# Patient Record
Sex: MALE | Race: WHITE | NOT HISPANIC OR LATINO | Employment: FULL TIME | URBAN - METROPOLITAN AREA
[De-identification: names, ages, dates, MRNs, and addresses within clinical notes are randomized per-mention and may not be internally consistent; named-entity substitution may affect disease eponyms.]

---

## 2021-04-22 ENCOUNTER — OCCUPATIONAL HEALTH (OUTPATIENT)
Dept: URGENT CARE | Facility: CLINIC | Age: 31
End: 2021-04-22

## 2021-04-22 PROCEDURE — 80305 DRUG TEST PRSMV DIR OPT OBS: CPT | Mod: S$GLB,,, | Performed by: EMERGENCY MEDICINE

## 2021-04-22 PROCEDURE — 99499 UNLISTED E&M SERVICE: CPT | Mod: S$GLB,,, | Performed by: EMERGENCY MEDICINE

## 2021-04-22 PROCEDURE — 80305 PR NON-DOT DRUG SCREENS: ICD-10-PCS | Mod: S$GLB,,, | Performed by: EMERGENCY MEDICINE

## 2021-04-22 PROCEDURE — 99499 PHYSICAL - BASIC COMPLEXITY: ICD-10-PCS | Mod: S$GLB,,, | Performed by: EMERGENCY MEDICINE

## 2023-05-31 ENCOUNTER — OFFICE VISIT (OUTPATIENT)
Dept: UROLOGY | Facility: CLINIC | Age: 33
End: 2023-05-31
Payer: COMMERCIAL

## 2023-05-31 VITALS
HEIGHT: 73 IN | BODY MASS INDEX: 30.97 KG/M2 | WEIGHT: 233.69 LBS | HEART RATE: 77 BPM | DIASTOLIC BLOOD PRESSURE: 79 MMHG | SYSTOLIC BLOOD PRESSURE: 134 MMHG

## 2023-05-31 DIAGNOSIS — E29.1 TESTICULAR FAILURE: Primary | ICD-10-CM

## 2023-05-31 DIAGNOSIS — I86.1 VARICOCELE: ICD-10-CM

## 2023-05-31 PROCEDURE — 1159F MED LIST DOCD IN RCRD: CPT | Mod: CPTII,S$GLB,, | Performed by: UROLOGY

## 2023-05-31 PROCEDURE — 3075F SYST BP GE 130 - 139MM HG: CPT | Mod: CPTII,S$GLB,, | Performed by: UROLOGY

## 2023-05-31 PROCEDURE — 3008F BODY MASS INDEX DOCD: CPT | Mod: CPTII,S$GLB,, | Performed by: UROLOGY

## 2023-05-31 PROCEDURE — 99999 PR PBB SHADOW E&M-EST. PATIENT-LVL III: CPT | Mod: PBBFAC,,, | Performed by: UROLOGY

## 2023-05-31 PROCEDURE — 1160F RVW MEDS BY RX/DR IN RCRD: CPT | Mod: CPTII,S$GLB,, | Performed by: UROLOGY

## 2023-05-31 PROCEDURE — 3078F PR MOST RECENT DIASTOLIC BLOOD PRESSURE < 80 MM HG: ICD-10-PCS | Mod: CPTII,S$GLB,, | Performed by: UROLOGY

## 2023-05-31 PROCEDURE — 99204 PR OFFICE/OUTPT VISIT, NEW, LEVL IV, 45-59 MIN: ICD-10-PCS | Mod: S$GLB,,, | Performed by: UROLOGY

## 2023-05-31 PROCEDURE — 1160F PR REVIEW ALL MEDS BY PRESCRIBER/CLIN PHARMACIST DOCUMENTED: ICD-10-PCS | Mod: CPTII,S$GLB,, | Performed by: UROLOGY

## 2023-05-31 PROCEDURE — 3078F DIAST BP <80 MM HG: CPT | Mod: CPTII,S$GLB,, | Performed by: UROLOGY

## 2023-05-31 PROCEDURE — 3075F PR MOST RECENT SYSTOLIC BLOOD PRESS GE 130-139MM HG: ICD-10-PCS | Mod: CPTII,S$GLB,, | Performed by: UROLOGY

## 2023-05-31 PROCEDURE — 1159F PR MEDICATION LIST DOCUMENTED IN MEDICAL RECORD: ICD-10-PCS | Mod: CPTII,S$GLB,, | Performed by: UROLOGY

## 2023-05-31 PROCEDURE — 3008F PR BODY MASS INDEX (BMI) DOCUMENTED: ICD-10-PCS | Mod: CPTII,S$GLB,, | Performed by: UROLOGY

## 2023-05-31 PROCEDURE — 99999 PR PBB SHADOW E&M-EST. PATIENT-LVL III: ICD-10-PCS | Mod: PBBFAC,,, | Performed by: UROLOGY

## 2023-05-31 PROCEDURE — 99204 OFFICE O/P NEW MOD 45 MIN: CPT | Mod: S$GLB,,, | Performed by: UROLOGY

## 2023-05-31 RX ORDER — TRAZODONE HYDROCHLORIDE 50 MG/1
0.5 TABLET ORAL NIGHTLY PRN
COMMUNITY
Start: 2023-03-22

## 2023-05-31 RX ORDER — LORAZEPAM 0.5 MG/1
0.5 TABLET ORAL
COMMUNITY
Start: 2023-03-22

## 2023-05-31 RX ORDER — PAROXETINE HYDROCHLORIDE HEMIHYDRATE 25 MG/1
1 TABLET, FILM COATED, EXTENDED RELEASE ORAL DAILY
COMMUNITY
Start: 2023-03-22

## 2023-05-31 NOTE — LETTER
May 31, 2023        Diego Love MD  [address]  [place] [address]             [facility]  [address]  [facility] [address]  Phone: [phone]   Patient: Binh Owusu   MR Number: [id number]   YOB: 1990   Date of Visit: 5/31/2023       Dear Dr. Love:    Thank you for referring Binh Owusu to me for evaluation. Attached you will find relevant portions of my assessment and plan of care.    If you have questions, please do not hesitate to call me. I look forward to following Binh Owusu along with you.    Sincerely,      Ronny Chilel MD            CC  No Recipients    Enclosure

## 2023-05-31 NOTE — PROGRESS NOTES
"Chief Complaint:  Infertility    HPI:    Mr. Owusu is a 33 y.o.  male who has been  to his wife for the past 7 months. They have been trying to achieve a pregnancy for the past 1 years but without success. Binh Owusu has not undergone a valid semen analysis. He denies a history of erectile dysfunction and ejaculatory problems.    He has achieved 0 pregnancies in the past.    HORMONE PANEL 23  T--224  LH--5.5  FSH--11.7  Prolactin--13.0    Nilda Owusu is 30 years old. ( [birth year]) Her menses are irregular. She has not undergone prior hysterosalpingogram. She has achieved 0 prior pregnancies.  She sees Dr. Love.    The couple has not undergone prior intrauterine insemination procedures.    The couple has not undergone prior in-vitro fertilization procedures.    Binh Owusu denies a history of exposure to harmful chemicals, toxins, and radiation.    No history of recent fevers greater than 101.5 degrees Farenheit.    No history of recent exposure to "wet heat."    No history of urological trauma or testicular torsion.    No history of prostatitis, epididymitis, and orchitis.    No history of post-pubertal mumps.    There is no known family history of fertility problems.    REVIEW OF SYSTEMS:     He denies headache, blurred vision, fever, nausea, vomiting, chills, abdominal pain, chest pain, sore throat, bleeding per rectum, cough, SOB, recent loss of consciousness, recent mental status changes, seizures, dizziness, or upper or lower extremity weakness.    PHYSICAL EXAM:     The patient generally appears in good health, is appropriately interactive, and is in no apparent distress.     Eyes: anicteric sclerae, moist conjunctivae; no lid-lag; PERRLA     HENT: Atraumatic; oropharynx clear with moist mucous membranes and no mucosal ulcerations;normal hard and soft palate.  No evidence of lymphadenopathy.    Neck: Trachea midline.  No thyromegaly.    Skin: No lesions.    Mental: Cooperative with normal " "affect.  Is oriented to time, place, and person.    Neuro: Grossly intact.    Chest: Normal inspiratory effort.   No accessory muscles.  No audible wheezes.  Respirations symmetric on inspiration and expiration.    Heart: Regular rhythm.      Abdomen:  Soft, non-tender. No masses or organomegaly. Bladder is not palpable. No evidence of flank discomfort. No evidence of inguinal hernia.    Genitourinary: Penis is normal with no evidence of plaques or induration. Urethral meatus is normal. Scrotum is normal. Testes are descended bilaterally with no evidence of abnormal masses or tenderness. Epididymis, vas deferens, and cord structures are normal bilaterally.  Testicular volume is approximately 18 cc bilaterally.  He has a L grade II varicocele.    Extremities: No cyanosis, clubbing, or edema.    IMPRESSION & PLAN:    Mr. Owusu is a 33 y.o.  male who has been  to his wife for the past 7 months. They have been trying to achieve a pregnancy for the past 1 years but without success. Binh Owusu has not undergone a valid semen analysis. He denies a history of erectile dysfunction and ejaculatory problems.    He has achieved 0 pregnancies in the past.    HORMONE PANEL 5/24/23  T--224  LH--5.5  FSH--11.7  Prolactin--13.0    He has a L grade II varicocele.    1.  testosterone, , and estradiol serum levels in the AM.  Independently interpreted his outside labs today.   2.  Semen analysis x 2.  3.  Return to the clinic in 3 weeks to discuss test results and treatment plan.  4.  Recommend avoiding "wet heat."  5.  Recommend taking a multivitamin and 500 mg of vitamin c daily in addition to the multivitamin.  6.  Please send a copy of the note to Dr. Love.  Thank you for the consultation.  7.  Discussed varicocele's potential impact on fertility.     CC: Ivan      "

## 2023-05-31 NOTE — LETTER
May 31, 2023        Montana Love MD  [address]  [address]  [facility] [place] [address]             [facility]  [address]  [facility] [address]  Phone: [phone]   Patient: Binh Owusu   MR Number: [id number]   YOB: 1990   Date of Visit: 5/31/2023       Dear Dr. Love:    Thank you for referring Binh Owusu to me for evaluation. Attached you will find relevant portions of my assessment and plan of care.    If you have questions, please do not hesitate to call me. I look forward to following Binh Owusu along with you.    Sincerely,      Ronny Chilel MD            CC  No Recipients    Enclosure

## 2023-06-01 ENCOUNTER — LAB VISIT (OUTPATIENT)
Dept: LAB | Facility: CLINIC | Age: 33
End: 2023-06-01
Attending: UROLOGY
Payer: COMMERCIAL

## 2023-06-01 DIAGNOSIS — E29.1 TESTICULAR FAILURE: ICD-10-CM

## 2023-06-01 LAB
ESTRADIOL SERPL-MCNC: 16 PG/ML (ref 11–44)
TESTOST SERPL-MCNC: 249 NG/DL (ref 304–1227)

## 2023-06-01 PROCEDURE — 82670 ASSAY OF TOTAL ESTRADIOL: CPT | Performed by: UROLOGY

## 2023-06-01 PROCEDURE — 84403 ASSAY OF TOTAL TESTOSTERONE: CPT | Performed by: UROLOGY

## 2023-07-19 ENCOUNTER — TELEPHONE (OUTPATIENT)
Dept: UROLOGY | Facility: CLINIC | Age: 33
End: 2023-07-19
Payer: COMMERCIAL

## 2023-07-19 NOTE — TELEPHONE ENCOUNTER
Call placed to patient. Name and date of birth verified. Patient requesting for semen analysis. Patient informed results will be discussed during follow-up appointment after completing 2nd semen analysis. Patient verbalized understanding.

## 2023-07-19 NOTE — TELEPHONE ENCOUNTER
----- Message from Delmi Byers sent at 7/19/2023 12:47 PM CDT -----  Regarding: pt advice  Contact: [phone]  Binh iglesias/Rajesh calling regarding Results  (message) for #labs that was done on 7.10i n Lucian . Please call

## 2023-07-27 ENCOUNTER — OFFICE VISIT (OUTPATIENT)
Dept: UROLOGY | Facility: CLINIC | Age: 33
End: 2023-07-27
Payer: COMMERCIAL

## 2023-07-27 VITALS
WEIGHT: 235.88 LBS | HEIGHT: 73 IN | SYSTOLIC BLOOD PRESSURE: 128 MMHG | BODY MASS INDEX: 31.26 KG/M2 | DIASTOLIC BLOOD PRESSURE: 86 MMHG | HEART RATE: 82 BPM

## 2023-07-27 DIAGNOSIS — I86.1 VARICOCELE: ICD-10-CM

## 2023-07-27 DIAGNOSIS — E29.1 TESTICULAR FAILURE: Primary | ICD-10-CM

## 2023-07-27 PROCEDURE — 3079F DIAST BP 80-89 MM HG: CPT | Mod: CPTII,S$GLB,, | Performed by: UROLOGY

## 2023-07-27 PROCEDURE — 3074F PR MOST RECENT SYSTOLIC BLOOD PRESSURE < 130 MM HG: ICD-10-PCS | Mod: CPTII,S$GLB,, | Performed by: UROLOGY

## 2023-07-27 PROCEDURE — 1160F PR REVIEW ALL MEDS BY PRESCRIBER/CLIN PHARMACIST DOCUMENTED: ICD-10-PCS | Mod: CPTII,S$GLB,, | Performed by: UROLOGY

## 2023-07-27 PROCEDURE — 99214 OFFICE O/P EST MOD 30 MIN: CPT | Mod: S$GLB,,, | Performed by: UROLOGY

## 2023-07-27 PROCEDURE — 3079F PR MOST RECENT DIASTOLIC BLOOD PRESSURE 80-89 MM HG: ICD-10-PCS | Mod: CPTII,S$GLB,, | Performed by: UROLOGY

## 2023-07-27 PROCEDURE — 3008F BODY MASS INDEX DOCD: CPT | Mod: CPTII,S$GLB,, | Performed by: UROLOGY

## 2023-07-27 PROCEDURE — 99999 PR PBB SHADOW E&M-EST. PATIENT-LVL III: CPT | Mod: PBBFAC,,, | Performed by: UROLOGY

## 2023-07-27 PROCEDURE — 1159F PR MEDICATION LIST DOCUMENTED IN MEDICAL RECORD: ICD-10-PCS | Mod: CPTII,S$GLB,, | Performed by: UROLOGY

## 2023-07-27 PROCEDURE — 99999 PR PBB SHADOW E&M-EST. PATIENT-LVL III: ICD-10-PCS | Mod: PBBFAC,,, | Performed by: UROLOGY

## 2023-07-27 PROCEDURE — 1159F MED LIST DOCD IN RCRD: CPT | Mod: CPTII,S$GLB,, | Performed by: UROLOGY

## 2023-07-27 PROCEDURE — 99214 PR OFFICE/OUTPT VISIT, EST, LEVL IV, 30-39 MIN: ICD-10-PCS | Mod: S$GLB,,, | Performed by: UROLOGY

## 2023-07-27 PROCEDURE — 1160F RVW MEDS BY RX/DR IN RCRD: CPT | Mod: CPTII,S$GLB,, | Performed by: UROLOGY

## 2023-07-27 PROCEDURE — 3008F PR BODY MASS INDEX (BMI) DOCUMENTED: ICD-10-PCS | Mod: CPTII,S$GLB,, | Performed by: UROLOGY

## 2023-07-27 PROCEDURE — 3074F SYST BP LT 130 MM HG: CPT | Mod: CPTII,S$GLB,, | Performed by: UROLOGY

## 2023-07-27 RX ORDER — CLOMIPHENE CITRATE 50 MG/1
TABLET ORAL
Qty: 15 TABLET | Refills: 5 | Status: SHIPPED | OUTPATIENT
Start: 2023-07-27 | End: 2023-10-11 | Stop reason: SDUPTHER

## 2023-07-27 NOTE — PROGRESS NOTES
"Chief Complaint:  Infertility    HPI:    Mr. Owusu is a 33 y.o.  male who has been  to his wife for the past 8 months. They have been trying to achieve a pregnancy for the past 1 years but without success. 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 He denies a history of erectile dysfunction and ejaculatory problems.    Lab Results   Component Value Date    TOTALTESTOST 249 (L) 2023    ESTRADIOL 16 2023    HORMONE PANEL 23  T--224  LH--5.5  FSH--11.7  Prolactin--13.0    SA 7/10/23 ([facility])--5.9 cc/cryptospermia (0 motile)  SA 23 ([facility])--6.3 cc/0.001 million per cc/43.5%/0%    FOR REVIEW FROM PREVIOUS:    Mr. Owusu is a 33 y.o.  male who has been  to his wife for the past 7 months. They have been trying to achieve a pregnancy for the past 1 years but without success. Binh Owusu has not undergone a valid semen analysis. He denies a history of erectile dysfunction and ejaculatory problems.    He has achieved 0 pregnancies in the past.    HORMONE PANEL 23  T--224  LH--5.5  FSH--11.7  Prolactin--13.0    Nilda Owusu is 30 years old. ( 89) Her menses are irregular. She has not undergone prior hysterosalpingogram. She has achieved 0 prior pregnancies.  She sees Dr. Love.    The couple has not undergone prior intrauterine insemination procedures.    The couple has not undergone prior in-vitro fertilization procedures.    Binh Owusu denies a history of exposure to harmful chemicals, toxins, and radiation.    No history of recent fevers greater than 101.5 degrees Farenheit.    No history of recent exposure to "wet heat."    No history of urological trauma or testicular torsion.    No history of prostatitis, epididymitis, and orchitis.    No history of post-pubertal mumps.    There is no known family history of fertility problems.    REVIEW OF SYSTEMS:     He denies headache, blurred vision, fever, nausea, vomiting, chills, " abdominal pain, chest pain, sore throat, bleeding per rectum, cough, SOB, recent loss of consciousness, recent mental status changes, seizures, dizziness, or upper or lower extremity weakness.    PHYSICAL EXAM:     The patient generally appears in good health, is appropriately interactive, and is in no apparent distress.     Eyes: anicteric sclerae, moist conjunctivae; no lid-lag; PERRLA     HENT: Atraumatic; oropharynx clear with moist mucous membranes and no mucosal ulcerations;normal hard and soft palate.  No evidence of lymphadenopathy.    Neck: Trachea midline.  No thyromegaly.    Skin: No lesions.    Mental: Cooperative with normal affect.  Is oriented to time, place, and person.    Neuro: Grossly intact.    Chest: Normal inspiratory effort.   No accessory muscles.  No audible wheezes.  Respirations symmetric on inspiration and expiration.    Heart: Regular rhythm.      Abdomen:  Soft, non-tender. No masses or organomegaly. Bladder is not palpable. No evidence of flank discomfort. No evidence of inguinal hernia.    Genitourinary: Penis is normal with no evidence of plaques or induration. Urethral meatus is normal. Scrotum is normal. Testes are descended bilaterally with no evidence of abnormal masses or tenderness. Epididymis, vas deferens, and cord structures are normal bilaterally.  Testicular volume is approximately 18 cc bilaterally.  He has a L grade II varicocele.    Extremities: No cyanosis, clubbing, or edema.    IMPRESSION & PLAN:    Mr. Owusu is a 33 y.o.  male who has been  to his wife for the past 8 months. They have been trying to achieve a pregnancy for the past 1 years but without success. 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 He denies a history of erectile dysfunction and ejaculatory problems.    Lab Results   Component Value Date    TOTALTESTOST 249 (L) 06/01/2023    ESTRADIOL 16 06/01/2023    HORMONE PANEL  "5/24/23  T--224  LH--5.5  FSH--11.7  Prolactin--13.0    SA 7/10/23 ([facility])--5.9 cc/cryptospermia (0 motile)  SA 7/20/23 ([facility])--6.3 cc/0.001 million per cc/43.5%/0%    He has a L grade II varicocele.    1.  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  2.  Discussed that his T is low. Recommend clomid. Side effects discussed.  A new Rx was given.  Will check a T and estradiol in 1 month.  Will also draw karyotype and Y chromosome microdeletion.  3.  Discussed varicocele's potential impact on fertility.  Recommend correction given his SA.  4.  Recommend avoiding "wet heat."  5.  Recommend taking a multivitamin and 500 mg of vitamin c daily in addition to the multivitamin.  6.  Please send a copy of the note to Dr. Love.       CC: Ivan        "

## 2023-07-27 NOTE — LETTER
July 27, 2023        Montana Love MD  [address]  [address]  [facility] [place] [address]             [facility] [address] Fl  [address]  [facility] [address]  Phone: [phone]   Patient: Binh Owusu   MR Number: [id number]   YOB: 1990   Date of Visit: 7/27/2023       Dear Dr. Love:    Thank you for referring Binh Owusu to me for evaluation. Attached you will find relevant portions of my assessment and plan of care.    If you have questions, please do not hesitate to call me. I look forward to following Binh Owusu along with you.    Sincerely,      Ronny Chilel MD            CC  No Recipients    Enclosure

## 2023-07-28 ENCOUNTER — TELEPHONE (OUTPATIENT)
Dept: UROLOGY | Facility: CLINIC | Age: 33
End: 2023-07-28
Payer: COMMERCIAL

## 2023-07-28 DIAGNOSIS — I86.1 VARICOCELE: Primary | ICD-10-CM

## 2023-08-22 ENCOUNTER — TELEPHONE (OUTPATIENT)
Dept: UROLOGY | Facility: CLINIC | Age: 33
End: 2023-08-22
Payer: COMMERCIAL

## 2023-08-22 ENCOUNTER — PATIENT MESSAGE (OUTPATIENT)
Dept: UROLOGY | Facility: CLINIC | Age: 33
End: 2023-08-22
Payer: COMMERCIAL

## 2023-08-22 NOTE — TELEPHONE ENCOUNTER
I called the patient to remind him to hold all Asprin related products 7 days prior to surgery. No Answer. No voicemail. I sent a portal message.

## 2023-08-29 ENCOUNTER — PATIENT MESSAGE (OUTPATIENT)
Dept: UROLOGY | Facility: CLINIC | Age: 33
End: 2023-08-29
Payer: COMMERCIAL

## 2023-08-29 ENCOUNTER — TELEPHONE (OUTPATIENT)
Dept: UROLOGY | Facility: CLINIC | Age: 33
End: 2023-08-29
Payer: COMMERCIAL

## 2023-08-29 NOTE — TELEPHONE ENCOUNTER
----- Message from Jailene Quinones sent at 8/29/2023  1:00 PM CDT -----  Contact: [phone]  Pt wife called inform the staff that the pt mother passed unexpected and that's why he wasn't answering the call and wants someone to give her a call bk please advise [phone]

## 2023-08-29 NOTE — TELEPHONE ENCOUNTER
Spoke to patient about the call back it was from the surgery scheduler informing Just a friendly reminder to make sure you hold all Asprin related products 7 days prior to surgery including Baby Asprin.  You may take Tylenol patient understood

## 2023-08-30 ENCOUNTER — LAB VISIT (OUTPATIENT)
Dept: LAB | Facility: CLINIC | Age: 33
End: 2023-08-30
Attending: UROLOGY
Payer: COMMERCIAL

## 2023-08-30 DIAGNOSIS — E29.1 TESTICULAR FAILURE: ICD-10-CM

## 2023-08-30 LAB
ESTRADIOL SERPL-MCNC: 52 PG/ML (ref 11–44)
TESTOST SERPL-MCNC: 538 NG/DL (ref 304–1227)

## 2023-08-30 PROCEDURE — 84403 ASSAY OF TOTAL TESTOSTERONE: CPT | Performed by: UROLOGY

## 2023-08-30 PROCEDURE — 82670 ASSAY OF TOTAL ESTRADIOL: CPT | Performed by: UROLOGY

## 2023-08-31 ENCOUNTER — PATIENT MESSAGE (OUTPATIENT)
Dept: UROLOGY | Facility: CLINIC | Age: 33
End: 2023-08-31
Payer: COMMERCIAL

## 2023-08-31 ENCOUNTER — TELEPHONE (OUTPATIENT)
Dept: UROLOGY | Facility: CLINIC | Age: 33
End: 2023-08-31
Payer: COMMERCIAL

## 2023-08-31 NOTE — TELEPHONE ENCOUNTER
Call was placed to patient twice phone wasn't in service to leave message sent patient a message through my chart to return call when available office name and number provided.

## 2023-09-01 ENCOUNTER — TELEPHONE (OUTPATIENT)
Dept: UROLOGY | Facility: CLINIC | Age: 33
End: 2023-09-01
Payer: COMMERCIAL

## 2023-09-01 ENCOUNTER — ANESTHESIA EVENT (OUTPATIENT)
Dept: SURGERY | Facility: HOSPITAL | Age: 33
End: 2023-09-01
Payer: COMMERCIAL

## 2023-09-01 NOTE — PRE-PROCEDURE INSTRUCTIONS
9/1/23 @ 1443: Pt returned RN's call. Pre-op instructions review. Pt verbalized understanding.    Unable to reach pt by phone. Unable to leave voicemail. The following was sent to pt portal.      Dear Binh ,    You are scheduled for a procedure with Dr. Chilel on 9/5/2023. Your scheduled arrival time is 7:10am.  This arrival time is roughly 2 hours before your anticipated procedure time to allow sufficient time for pre-op.  Please wear comfortable clothes.  This procedure will take place at the [facility] at the corner of [facility] [place] and [facility].  It is in [facility] next to [facility].  The address is:    [address] [place] [address].  [place] [address]    After entering the building, you will proceed to the second floor where you can check in with registration. You should take any medications that you routinely take for blood pressure (other than those listed below), heart medications, thyroid, cholesterol, etc.     If you wear contact lenses, please wear glasses to your procedure.    Your fasting instructions are as follow:  Nothing to eat or drink after midnight Monday night. You may have small amounts of water to take medications in the morning. You MUST have a responsible adult to bring you home.      Monday evening (9/4/2023) and Tuesday morning, please hold the following medications:  -Aspirin and Aspirin-containing products (Goody's powder, Excedrin)  -NSAIDs (Advil, Ibuprofen, Aleve, Diclofenac)  -Vitamins/Supplements  -Herbal remedies/Teas  -Stimulants (Adderall, Vyvanse, Adipex)  -Diabetic medication (Please bring with you day of procedure)    -May take Tylenol      Monday evening (9/4/2023) and Tuesday morning, take a shower using antibacterial soap (ex: Hibiclens or Dial antibacterial soap). DO NOT apply deodorant, lotion, cologne, or anything else to the skin.    If you have any procedure-specific questions, please call your surgeon's office. Any other  questions, don't hesitate to call at [phone].    Thanks,  ALEXANDRO Norman  Pre-Admit Dept [facility]

## 2023-09-01 NOTE — TELEPHONE ENCOUNTER
----- Message from Flaco Sears sent at 9/1/2023  8:43 AM CDT -----  Regarding: pt returning call  Contact: pt [phone]  PATIENT CALL    Pt is calling to speak with someone in provider's office regarding a missed call from Wade. Please call pt at [phone] to advise about the procedure on Tuesday

## 2023-09-01 NOTE — TELEPHONE ENCOUNTER
Call was placed to patient regarding call back he was notified that  stated his Testosterone and estradiol looks great and continue clomid. Patient understood and agreed.

## 2023-09-05 ENCOUNTER — HOSPITAL ENCOUNTER (OUTPATIENT)
Facility: HOSPITAL | Age: 33
Discharge: HOME | End: 2023-09-05
Attending: UROLOGY | Admitting: UROLOGY
Payer: COMMERCIAL

## 2023-09-05 ENCOUNTER — ANESTHESIA (OUTPATIENT)
Dept: SURGERY | Facility: HOSPITAL | Age: 33
End: 2023-09-05
Payer: COMMERCIAL

## 2023-09-05 VITALS
HEART RATE: 79 BPM | RESPIRATION RATE: 20 BRPM | TEMPERATURE: 99 F | OXYGEN SATURATION: 95 % | HEIGHT: 73 IN | SYSTOLIC BLOOD PRESSURE: 146 MMHG | DIASTOLIC BLOOD PRESSURE: 70 MMHG | WEIGHT: 240 LBS | BODY MASS INDEX: 31.81 KG/M2

## 2023-09-05 DIAGNOSIS — I86.1 VARICOCELE: ICD-10-CM

## 2023-09-05 PROCEDURE — 76998 PR  ULTRASONIC GUIDANCE, INTRAOPERATIVE: ICD-10-PCS | Mod: 26,,, | Performed by: UROLOGY

## 2023-09-05 PROCEDURE — 25000003 PHARM REV CODE 250: Performed by: UROLOGY

## 2023-09-05 PROCEDURE — 37000008 HC ANESTHESIA 1ST 15 MINUTES: Performed by: UROLOGY

## 2023-09-05 PROCEDURE — 99900035 HC TECH TIME PER 15 MIN (STAT)

## 2023-09-05 PROCEDURE — D9220A PRA ANESTHESIA: Mod: ,,, | Performed by: NURSE ANESTHETIST, CERTIFIED REGISTERED

## 2023-09-05 PROCEDURE — 71000033 HC RECOVERY, INTIAL HOUR: Performed by: UROLOGY

## 2023-09-05 PROCEDURE — 37000009 HC ANESTHESIA EA ADD 15 MINS: Performed by: UROLOGY

## 2023-09-05 PROCEDURE — 55530 PR EXCISE VARICOCELE: ICD-10-PCS | Mod: LT,,, | Performed by: UROLOGY

## 2023-09-05 PROCEDURE — 36000706: Performed by: UROLOGY

## 2023-09-05 PROCEDURE — 55530 REVISE SPERMATIC CORD VEINS: CPT | Mod: LT,,, | Performed by: UROLOGY

## 2023-09-05 PROCEDURE — 63600175 PHARM REV CODE 636 W HCPCS: Performed by: NURSE ANESTHETIST, CERTIFIED REGISTERED

## 2023-09-05 PROCEDURE — 25000003 PHARM REV CODE 250: Performed by: NURSE ANESTHETIST, CERTIFIED REGISTERED

## 2023-09-05 PROCEDURE — 36000707: Performed by: UROLOGY

## 2023-09-05 PROCEDURE — 63600175 PHARM REV CODE 636 W HCPCS: Performed by: ANESTHESIOLOGY

## 2023-09-05 PROCEDURE — 25000242 PHARM REV CODE 250 ALT 637 W/ HCPCS: Performed by: NURSE ANESTHETIST, CERTIFIED REGISTERED

## 2023-09-05 PROCEDURE — 25000003 PHARM REV CODE 250: Performed by: STUDENT IN AN ORGANIZED HEALTH CARE EDUCATION/TRAINING PROGRAM

## 2023-09-05 PROCEDURE — 25000003 PHARM REV CODE 250: Performed by: ANESTHESIOLOGY

## 2023-09-05 PROCEDURE — 94761 N-INVAS EAR/PLS OXIMETRY MLT: CPT

## 2023-09-05 PROCEDURE — 71000015 HC POSTOP RECOV 1ST HR: Performed by: UROLOGY

## 2023-09-05 PROCEDURE — D9220A PRA ANESTHESIA: ICD-10-PCS | Mod: ,,, | Performed by: NURSE ANESTHETIST, CERTIFIED REGISTERED

## 2023-09-05 PROCEDURE — 71000039 HC RECOVERY, EACH ADD'L HOUR: Performed by: UROLOGY

## 2023-09-05 PROCEDURE — 63600175 PHARM REV CODE 636 W HCPCS: Performed by: STUDENT IN AN ORGANIZED HEALTH CARE EDUCATION/TRAINING PROGRAM

## 2023-09-05 PROCEDURE — 76998 US GUIDE INTRAOP: CPT | Mod: 26,,, | Performed by: UROLOGY

## 2023-09-05 RX ORDER — MIDAZOLAM HYDROCHLORIDE 1 MG/ML
INJECTION, SOLUTION INTRAMUSCULAR; INTRAVENOUS
Status: DISCONTINUED | OUTPATIENT
Start: 2023-09-05 | End: 2023-09-05

## 2023-09-05 RX ORDER — ACETAMINOPHEN 500 MG
1000 TABLET ORAL
Status: COMPLETED | OUTPATIENT
Start: 2023-09-05 | End: 2023-09-05

## 2023-09-05 RX ORDER — HYDROCODONE BITARTRATE AND ACETAMINOPHEN 5; 325 MG/1; MG/1
1 TABLET ORAL ONCE AS NEEDED
Status: DISCONTINUED | OUTPATIENT
Start: 2023-09-05 | End: 2023-09-05 | Stop reason: HOSPADM

## 2023-09-05 RX ORDER — FENTANYL CITRATE 50 UG/ML
INJECTION, SOLUTION INTRAMUSCULAR; INTRAVENOUS
Status: DISCONTINUED | OUTPATIENT
Start: 2023-09-05 | End: 2023-09-05

## 2023-09-05 RX ORDER — ALBUTEROL SULFATE 90 UG/1
AEROSOL, METERED RESPIRATORY (INHALATION)
Status: DISCONTINUED | OUTPATIENT
Start: 2023-09-05 | End: 2023-09-05

## 2023-09-05 RX ORDER — ONDANSETRON 2 MG/ML
4 INJECTION INTRAMUSCULAR; INTRAVENOUS ONCE AS NEEDED
Status: DISCONTINUED | OUTPATIENT
Start: 2023-09-05 | End: 2023-09-05 | Stop reason: HOSPADM

## 2023-09-05 RX ORDER — DEXAMETHASONE SODIUM PHOSPHATE 4 MG/ML
INJECTION, SOLUTION INTRA-ARTICULAR; INTRALESIONAL; INTRAMUSCULAR; INTRAVENOUS; SOFT TISSUE
Status: DISCONTINUED | OUTPATIENT
Start: 2023-09-05 | End: 2023-09-05

## 2023-09-05 RX ORDER — PROPOFOL 10 MG/ML
VIAL (ML) INTRAVENOUS
Status: DISCONTINUED | OUTPATIENT
Start: 2023-09-05 | End: 2023-09-05

## 2023-09-05 RX ORDER — OXYCODONE AND ACETAMINOPHEN 5; 325 MG/1; MG/1
1 TABLET ORAL EVERY 4 HOURS PRN
Qty: 7 TABLET | Refills: 0 | Status: SHIPPED | OUTPATIENT
Start: 2023-09-05

## 2023-09-05 RX ORDER — SUCCINYLCHOLINE CHLORIDE 20 MG/ML
INJECTION INTRAMUSCULAR; INTRAVENOUS
Status: DISCONTINUED | OUTPATIENT
Start: 2023-09-05 | End: 2023-09-05

## 2023-09-05 RX ORDER — ACETAMINOPHEN 10 MG/ML
1000 INJECTION, SOLUTION INTRAVENOUS ONCE
Status: COMPLETED | OUTPATIENT
Start: 2023-09-05 | End: 2023-09-05

## 2023-09-05 RX ORDER — DEXMEDETOMIDINE HYDROCHLORIDE 100 UG/ML
INJECTION, SOLUTION INTRAVENOUS
Status: DISCONTINUED | OUTPATIENT
Start: 2023-09-05 | End: 2023-09-05

## 2023-09-05 RX ORDER — LIDOCAINE HYDROCHLORIDE 20 MG/ML
INJECTION INTRAVENOUS
Status: DISCONTINUED | OUTPATIENT
Start: 2023-09-05 | End: 2023-09-05

## 2023-09-05 RX ORDER — LIDOCAINE HYDROCHLORIDE 10 MG/ML
INJECTION, SOLUTION EPIDURAL; INFILTRATION; INTRACAUDAL; PERINEURAL
Status: DISCONTINUED | OUTPATIENT
Start: 2023-09-05 | End: 2023-09-05 | Stop reason: HOSPADM

## 2023-09-05 RX ORDER — MORPHINE SULFATE 2 MG/ML
1 INJECTION, SOLUTION INTRAMUSCULAR; INTRAVENOUS EVERY 10 MIN PRN
Status: COMPLETED | OUTPATIENT
Start: 2023-09-05 | End: 2023-09-05

## 2023-09-05 RX ORDER — ALPRAZOLAM 0.5 MG/1
0.5 TABLET ORAL ONCE AS NEEDED
Status: DISCONTINUED | OUTPATIENT
Start: 2023-09-05 | End: 2023-09-05 | Stop reason: HOSPADM

## 2023-09-05 RX ORDER — ROCURONIUM BROMIDE 10 MG/ML
INJECTION, SOLUTION INTRAVENOUS
Status: DISCONTINUED | OUTPATIENT
Start: 2023-09-05 | End: 2023-09-05

## 2023-09-05 RX ADMIN — ACETAMINOPHEN 1000 MG: 500 TABLET ORAL at 08:09

## 2023-09-05 RX ADMIN — SODIUM CHLORIDE: 9 INJECTION, SOLUTION INTRAVENOUS at 08:09

## 2023-09-05 RX ADMIN — MIDAZOLAM HYDROCHLORIDE 0.5 MG: 1 INJECTION, SOLUTION INTRAMUSCULAR; INTRAVENOUS at 10:09

## 2023-09-05 RX ADMIN — ACETAMINOPHEN 1000 MG: 10 INJECTION INTRAVENOUS at 10:09

## 2023-09-05 RX ADMIN — FENTANYL CITRATE 50 MCG: 50 INJECTION, SOLUTION INTRAMUSCULAR; INTRAVENOUS at 08:09

## 2023-09-05 RX ADMIN — MORPHINE SULFATE 1 MG: 2 INJECTION, SOLUTION INTRAMUSCULAR; INTRAVENOUS at 12:09

## 2023-09-05 RX ADMIN — SUGAMMADEX 200 MG: 100 INJECTION, SOLUTION INTRAVENOUS at 09:09

## 2023-09-05 RX ADMIN — VANCOMYCIN HYDROCHLORIDE 1500 MG: 1.5 INJECTION, POWDER, LYOPHILIZED, FOR SOLUTION INTRAVENOUS at 08:09

## 2023-09-05 RX ADMIN — ALBUTEROL SULFATE 5 PUFF: 108 INHALANT RESPIRATORY (INHALATION) at 10:09

## 2023-09-05 RX ADMIN — PROPOFOL 200 MG: 10 INJECTION, EMULSION INTRAVENOUS at 08:09

## 2023-09-05 RX ADMIN — DEXMEDETOMIDINE 4 MCG: 200 INJECTION, SOLUTION INTRAVENOUS at 09:09

## 2023-09-05 RX ADMIN — PROPOFOL 50 MG: 10 INJECTION, EMULSION INTRAVENOUS at 09:09

## 2023-09-05 RX ADMIN — ROCURONIUM BROMIDE 10 MG: 10 INJECTION INTRAVENOUS at 08:09

## 2023-09-05 RX ADMIN — LIDOCAINE HYDROCHLORIDE 100 MG: 20 INJECTION INTRAVENOUS at 08:09

## 2023-09-05 RX ADMIN — ROCURONIUM BROMIDE 20 MG: 10 INJECTION INTRAVENOUS at 09:09

## 2023-09-05 RX ADMIN — SUCCINYLCHOLINE CHLORIDE 120 MG: 20 INJECTION, SOLUTION INTRAMUSCULAR; INTRAVENOUS at 09:09

## 2023-09-05 RX ADMIN — MIDAZOLAM HYDROCHLORIDE 2 MG: 1 INJECTION, SOLUTION INTRAMUSCULAR; INTRAVENOUS at 08:09

## 2023-09-05 RX ADMIN — MORPHINE SULFATE 1 MG: 2 INJECTION, SOLUTION INTRAMUSCULAR; INTRAVENOUS at 11:09

## 2023-09-05 RX ADMIN — DEXAMETHASONE SODIUM PHOSPHATE 4 MG: 4 INJECTION INTRA-ARTICULAR; INTRALESIONAL; INTRAMUSCULAR; INTRAVENOUS; SOFT TISSUE at 09:09

## 2023-09-05 RX ADMIN — ROCURONIUM BROMIDE 30 MG: 10 INJECTION INTRAVENOUS at 09:09

## 2023-09-05 RX ADMIN — DEXMEDETOMIDINE 12 MCG: 200 INJECTION, SOLUTION INTRAVENOUS at 09:09

## 2023-09-05 NOTE — TRANSFER OF CARE
"Anesthesia Transfer of Care Note    Patient: Binh Owusu    Procedure(s) Performed: Procedure(s) (LRB):  EXCISION, VARICOCELE (Left)    Patient location: PACU    Anesthesia Type: general    Transport from OR: Transported from OR on 6-10 L/min O2 by face mask with adequate spontaneous ventilation    Post pain: adequate analgesia    Post assessment: no apparent anesthetic complications and tolerated procedure well    Post vital signs: stable    Level of consciousness: awake    Nausea/Vomiting: no nausea/vomiting    Complications: none    Transfer of care protocol was followed      Last vitals:   Visit Vitals  /83 (BP Location: Right arm, Patient Position: Lying)   Pulse 60   Temp 37.1 °C (98.8 °F) (Temporal)   Resp 16   Ht 6' 1" (1.854 m)   Wt 108.9 kg (240 lb)   SpO2 100%   BMI 31.66 kg/m²     "

## 2023-09-05 NOTE — DISCHARGE SUMMARY
[facility]  Discharge Note  Short Stay    Admit Date: 9/5/2023    Discharge Date and Time: 09/05/2023 9:58 AM      Attending Physician: Ronny Chilel MD     Discharge Provider: Anu Romero    Diagnoses:  Past Medical History:   Diagnosis Date    Anxiety disorder, unspecified        Discharged Condition: good    Hospital Course: Patient was admitted for varicocelectomy and tolerated the procedure well with no complications. The patient was discharged home in good condition on the same day.       Final Diagnoses: Same as principal problem.    Disposition: Home or Self Care    Follow up/Patient Instructions:    Medications:  Reconciled Home Medications:   Current Discharge Medication List        START taking these medications    Details   oxyCODONE-acetaminophen (PERCOCET) 5-325 mg per tablet Take 1 tablet by mouth every 4 (four) hours as needed for Pain.  Qty: 7 tablet, Refills: 0    Comments: n/a            CONTINUE these medications which have NOT CHANGED    Details   clomiPHENE (CLOMID) 50 mg tablet Take 1/2 PO QD  Qty: 15 tablet, Refills: 5      LORazepam (ATIVAN) 0.5 MG tablet Take 0.5 tablets by mouth as needed.      paroxetine (PAXIL-CR) 25 MG 24 hr tablet Take 1 tablet by mouth once daily.      traZODone (DESYREL) 50 MG tablet Take 0.5 tablets by mouth nightly as needed.           No discharge procedures on file.   Follow-up Information       Ronny Chilel MD Follow up.    Specialty: Urology  Why: Follow up  Contact information:  [address]  [facility] [address]  [phone]                             Discharge Procedure Orders (must include Diet, Follow-up, Activity):  No discharge procedures on file.

## 2023-09-05 NOTE — ANESTHESIA PREPROCEDURE EVALUATION
09/05/2023  Binh Owusu is a 33 y.o., male.      Pre-op Assessment    I have reviewed the Patient Summary Reports.    I have reviewed the NPO Status.   I have reviewed the Medications.     Review of Systems  Anesthesia Hx:  Denies Family Hx of Anesthesia complications.   Denies Personal Hx of Anesthesia complications.   Social:  Social Alcohol Use chew  tobacco     Cardiovascular:  Cardiovascular Normal Exercise tolerance: good  NYHA Classification II    Pulmonary:   Sleep apnea: loud snoring,tireness.    Renal/:  Renal/ Normal     Hepatic/GI:   GERD: occ    Musculoskeletal:  Musculoskeletal Normal    Neurological:  Neurology Normal    Endocrine:   Panic attack  Right adrenal mass pheochromocytoma s/p removal   Palpitation, sweating, and nausea prior to sx resolved  S/p hospital admission 2021 Obesity / BMI > 30  Psych:   Psychiatric History anxiety          Physical Exam  General: Well nourished, Cooperative, Alert and Oriented    Airway:  Mallampati: II   Mouth Opening: Normal  TM Distance: Normal  Tongue: Normal  Neck: Girth Increased    Dental:  Intact        Anesthesia Plan  Type of Anesthesia, risks & benefits discussed:    Anesthesia Type: Gen ETT  Intra-op Monitoring Plan: Standard ASA Monitors  Post Op Pain Control Plan: multimodal analgesia and IV/PO Opioids PRN  Induction:  IV  Airway Plan: Video  Informed Consent: Informed consent signed with the Patient and all parties understand the risks and agree with anesthesia plan.  All questions answered.   ASA Score: 2  Day of Surgery Review of History & Physical: H&P Update referred to the surgeon/provider.I have interviewed and examined the patient. I have reviewed the patient's H&P dated: There are no significant changes. H&P completed by Anesthesiologist.    Ready For Surgery From Anesthesia Perspective.     .

## 2023-09-05 NOTE — OP NOTE
[facility]  Operative Note    Date: 09/05/2023    Pre-Op Diagnosis: left varicocele    Post-Op Diagnosis: same    Procedure(s) Performed:   left microscopic varicocelectomy  Intraoperative ultrasound    Specimen(s): none    Staff Surgeon: Ronny Chilel MD    Assistant Surgeon: Anu Romero MD    Anesthesia: General endotracheal anesthesia    Indications: Binh wOusu is a 33 y.o. male with infertility and a left varicocele.  Semen analysis showed severe oligoasthenoteratospermia.  After discussion with the patient, he has elected to undergo surgical correction of his varicocele.      Findings: left grade 2 varicocele    Estimated Blood Loss: <5mL    Drains: none    Procedure in Detail:  After informed consent was obtained, the patient was transferred to the operating room and placed in the supine position. Anesthesia was administered. The microscope was positioned and focused prior to prepping and draping. He was then shaved, prepped and draped in the usual sterile fashion. A marking pen was used to sylvester the skin over the subinguinal area over the left spermatic cord. This sylvester was incised with a 15 blade, creating a transverse skin incision along Cassandra's lines measuring approximately 3 cm. A hemostat was used to elevate the subcutaneous tissues. Bovie cautery was used to dissect down through Camper's and Hannah's fascia. Blunt dissection was then used to dissect down to the fascia. The spermatic cord was identified and with blunt dissection was freed circumferentially and delivered through the incision. Cremasteric fibers were released from the cord structures. A penrose drain was placed underneath the spermatic cord.    The operating Leica microscope was brought into the operative field. The vas deferens was identified. The surgeon's hand was used to compress the vas deferens and provide traction throughout the procedure. Quique forceps were used to dissect away the layers of the  spermatic cord fascia. In this fashion, the internal spermatic veins were identified and dissected using Rd hemostats and Quique forceps. The veins were ligated using 3-0 silk ties. The testicular artery was identified visually as well as with Doppler ultrasound. It was preserved throughout the case and verified each time before tying off a vein. Vasal vessels and lymphatics were also preserved. Careful inspection of the cord showed no other veins requiring ligation.    Hemostasis was confirmed, the penrose was removed, and the cord was placed back into its normal anatomic position. The incision was irrigated with normal saline. 3-0 Vicryl suture was used in a running fashion to close the deep dermal layer. 4-0 monocryl running subcuticular suture was used to close the skin. Sterile dressing was applied using steri strips and island dressing. The patient was awakened and transferred to the recovery room in stable condition.    Post Operative Plan  The patient will be discharged home today.  He is to refrain from any heavy lifting or exercise for 2 weeks time.  He will follow up with Dr. Chilel in 4 weeks time.

## 2023-09-05 NOTE — PATIENT INSTRUCTIONS
Postoperative Instructions  No strenuous exercise or heavy lifting greater than 10 pounds or a gallon of milk for 2 weeks  Do not strain to have a bowel movement  No driving while you are on narcotic pain medications   No showering until 24 hours after surgery    Remove bandage/dressing in 24 hours. Bruising and mild swelling is expected.   Wear a jock strap or compression underwear to decrease swelling    Call the doctor if:  Temperature is greater than 101F  Persistent vomiting and inability to keep food down

## 2023-09-05 NOTE — ANESTHESIA PROCEDURE NOTES
Intubation    Date/Time: 9/5/2023 9:02 AM    Performed by: Tosin Meeks CRNA  Authorized by: Terence Hawthorne MD    Intubation:     Induction:  Intravenous    Intubated:  Postinduction    Mask Ventilation:  Easy mask    Attempts:  1    Attempted By:  CRNA    Method of Intubation:  Video laryngoscopy    Blade:  Sparrow 4    Laryngeal View Grade: Grade I - full view of cords      Difficult Airway Encountered?: No      Complications:  None    Airway Device:  Oral endotracheal tube    Airway Device Size:  8.0    Style/Cuff Inflation:  Cuffed (inflated to minimal occlusive pressure)    Inflation Amount (mL):  8    Tube secured:  22    Secured at:  The lips    Placement Verified By:  Capnometry    Complicating Factors:  Obesity    Findings Post-Intubation:  BS equal bilateral and atraumatic/condition of teeth unchanged

## 2023-09-05 NOTE — PLAN OF CARE
Chart reviewed. Preop nursing care completed per orders. Safe surgery checklist complete. Pt denies any open wounds cuts or sores. Pt denies any metal in body.Family at bedside and belongings placed in PACU locker 5. Waiting for H&P; surgical consent; and anesthesia consent prior to surgery. Pt AAOX4, VSS on room air. Pt toileted, Bed locked in lowest position, Call light within reach. Pt denies any needs at this time.

## 2023-09-05 NOTE — H&P
"Urology [facility]    HPI:  Binh Owusu is a 33 y.o. male who has been  to his wife for the past 8 months. They have been trying to achieve a pregnancy for the past 1 years but without success. 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 He denies a history of erectile dysfunction and ejaculatory problems. He has a L grade II varicocele.    ROS:  Neg except per HPI    Past Medical History:   Diagnosis Date    Anxiety disorder, unspecified        Past Surgical History:   Procedure Laterality Date    ADRENAL GLAND SURGERY Right     HERNIA REPAIR         Social History     Socioeconomic History    Marital status: Unknown   Tobacco Use    Smoking status: Never    Smokeless tobacco: Current     Types: Chew   Substance and Sexual Activity    Alcohol use: Yes     Comment: socialy    Drug use: Never    Sexual activity: Yes     Partners: Female       Family History   Problem Relation Age of Onset    Diabetes Father     Hypertension Father     Hypertension Mother        Review of patient's allergies indicates:   Allergen Reactions    Cephalosporins     Penicillins Nausea And Vomiting    Azithromycin        No current facility-administered medications on file prior to encounter.     Current Outpatient Medications on File Prior to Encounter   Medication Sig Dispense Refill    clomiPHENE (CLOMID) 50 mg tablet Take 1/2 PO QD 15 tablet 5    LORazepam (ATIVAN) 0.5 MG tablet Take 0.5 tablets by mouth as needed.      paroxetine (PAXIL-CR) 25 MG 24 hr tablet Take 1 tablet by mouth once daily.      traZODone (DESYREL) 50 MG tablet Take 0.5 tablets by mouth nightly as needed.         Anticoagulation:  No    Physical Exam:  Estimated body mass index is 31.66 kg/m² as calculated from the following:    Height as of this encounter: 6' 1" (1.854 m).    Weight as of this encounter: 108.9 kg (240 lb).    General: No acute distress, well developed. AAOx3  Head: Normocephalic, " "Atraumatic  Eyes: Extra-occular movements intact, No discharge  Neck: supple, symmetrical, trachea midline  Lungs: normal respiratory effort, no respiratory distress, no wheezes  CV: regular rate, 2+ pulses  Abdomen: soft, non-tender, non-distended, no organomegaly  MSK: no edema, no deformities, normal ROM  Skin: skin color, texture, turgor normal.  Neurologic: no focal deficits, sensation intact    Labs:    No results found for: "WBC", "HGB", "HCT", "MCV", "PLT"        BMP  No results found for: "NA", "K", "CL", "CO2", "BUN", "CREATININE", "CALCIUM", "ANIONGAP", "EGFRNORACEVR"      Assessment: Binh Owusu is a 33 y.o. male with who has been  to his wife for the past 8 months. They have been trying to achieve a pregnancy for the past 1 years but without success. 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 He denies a history of erectile dysfunction and ejaculatory problems. He has a L grade II varicocele.    Plan:     1. To OR for Left varicocelectomy.   2. Consents signed   3. I have explained the risk, benefits, and alternatives of the procedure in detail. The patient voices understanding and all questions have been answered. The patient agrees to proceed as planned.     Anu Romero MD    "

## 2023-10-05 ENCOUNTER — TELEPHONE (OUTPATIENT)
Dept: UROLOGY | Facility: CLINIC | Age: 33
End: 2023-10-05
Payer: COMMERCIAL

## 2023-10-05 NOTE — TELEPHONE ENCOUNTER
----- Message from Bess Kilgore sent at 10/5/2023  8:36 AM CDT -----  Regarding: Appt  Contact: Pt [phone]  Pt is calling about missed appt pt states he was supposed to have a test done before appt and wants to speak to nurse please call

## 2023-10-05 NOTE — TELEPHONE ENCOUNTER
Call was placed to patient returning phone call back he missed his appointment yesterday with  wanted to be rescheduled thought he ha to have labs done prior to appointment informed him it was just his 4 week ost op rescheduled to next wed @ 1:15 PM. Patient agreed and understood.

## 2023-10-11 ENCOUNTER — LAB VISIT (OUTPATIENT)
Dept: LAB | Facility: HOSPITAL | Age: 33
End: 2023-10-11
Attending: UROLOGY
Payer: COMMERCIAL

## 2023-10-11 ENCOUNTER — OFFICE VISIT (OUTPATIENT)
Dept: UROLOGY | Facility: CLINIC | Age: 33
End: 2023-10-11
Payer: COMMERCIAL

## 2023-10-11 VITALS
HEART RATE: 85 BPM | SYSTOLIC BLOOD PRESSURE: 149 MMHG | WEIGHT: 240 LBS | HEIGHT: 73 IN | DIASTOLIC BLOOD PRESSURE: 85 MMHG | BODY MASS INDEX: 31.81 KG/M2

## 2023-10-11 DIAGNOSIS — I86.1 VARICOCELE: Primary | ICD-10-CM

## 2023-10-11 DIAGNOSIS — E29.1 TESTICULAR FAILURE: ICD-10-CM

## 2023-10-11 PROCEDURE — 88230 TISSUE CULTURE LYMPHOCYTE: CPT | Performed by: UROLOGY

## 2023-10-11 PROCEDURE — 81403 MOPATH PROCEDURE LEVEL 4: CPT | Performed by: UROLOGY

## 2023-10-11 PROCEDURE — 88262 CHROMOSOME ANALYSIS 15-20: CPT | Performed by: UROLOGY

## 2023-10-11 PROCEDURE — 1159F PR MEDICATION LIST DOCUMENTED IN MEDICAL RECORD: ICD-10-PCS | Mod: CPTII,S$GLB,, | Performed by: UROLOGY

## 2023-10-11 PROCEDURE — 3077F SYST BP >= 140 MM HG: CPT | Mod: CPTII,S$GLB,, | Performed by: UROLOGY

## 2023-10-11 PROCEDURE — 3077F PR MOST RECENT SYSTOLIC BLOOD PRESSURE >= 140 MM HG: ICD-10-PCS | Mod: CPTII,S$GLB,, | Performed by: UROLOGY

## 2023-10-11 PROCEDURE — 99024 POSTOP FOLLOW-UP VISIT: CPT | Mod: S$GLB,,, | Performed by: UROLOGY

## 2023-10-11 PROCEDURE — 99999 PR PBB SHADOW E&M-EST. PATIENT-LVL III: ICD-10-PCS | Mod: PBBFAC,,, | Performed by: UROLOGY

## 2023-10-11 PROCEDURE — 36415 COLL VENOUS BLD VENIPUNCTURE: CPT | Performed by: UROLOGY

## 2023-10-11 PROCEDURE — 3079F DIAST BP 80-89 MM HG: CPT | Mod: CPTII,S$GLB,, | Performed by: UROLOGY

## 2023-10-11 PROCEDURE — 1159F MED LIST DOCD IN RCRD: CPT | Mod: CPTII,S$GLB,, | Performed by: UROLOGY

## 2023-10-11 PROCEDURE — 3079F PR MOST RECENT DIASTOLIC BLOOD PRESSURE 80-89 MM HG: ICD-10-PCS | Mod: CPTII,S$GLB,, | Performed by: UROLOGY

## 2023-10-11 PROCEDURE — 99999 PR PBB SHADOW E&M-EST. PATIENT-LVL III: CPT | Mod: PBBFAC,,, | Performed by: UROLOGY

## 2023-10-11 PROCEDURE — 99024 PR POST-OP FOLLOW-UP VISIT: ICD-10-PCS | Mod: S$GLB,,, | Performed by: UROLOGY

## 2023-10-11 RX ORDER — CLOMIPHENE CITRATE 50 MG/1
TABLET ORAL
Qty: 15 TABLET | Refills: 5 | Status: SHIPPED | OUTPATIENT
Start: 2023-10-11

## 2023-10-11 NOTE — PROGRESS NOTES
Chief Complaint:  Infertility    HPI:    Mr. Owusu is a 33 y.o.  male who has been  to his wife for the past 1 year. They have been trying to achieve a pregnancy for the past 1 years but without success. 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 He denies a history of erectile dysfunction and ejaculatory problems.    He was started on clomid for a low T.    Is s/p L varicocelectomy on 23.    Lab Results   Component Value Date    TOTALTESTOST 538 2023    ESTRADIOL 52 (H) 2023    HORMONE PANEL 23  T--224  LH--5.5  FSH--11.7  Prolactin--13.0    SA 7/10/23 ([facility])--5.9 cc/cryptospermia (0 motile)  SA 23 ([facility])--6.3 cc/0.001 million per cc/43.5%/0%    FOR REVIEW FROM PREVIOUS:    Mr. Owusu is a 33 y.o.  male who has been  to his wife for the past 8 months. They have been trying to achieve a pregnancy for the past 1 years but without success. 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 He denies a history of erectile dysfunction and ejaculatory problems.    Lab Results   Component Value Date    TOTALTESTOST 249 (L) 2023    ESTRADIOL 16 2023    HORMONE PANEL 23  T--224  LH--5.5  FSH--11.7  Prolactin--13.0    SA 7/10/23 ([facility])--5.9 cc/cryptospermia (0 motile)  SA 23 ([facility])--6.3 cc/0.001 million per cc/43.5%/0%    HORMONE PANEL 23  T--224  LH--5.5  FSH--11.7  Prolactin--13.0    Nilda Owusu is 30 years old. ( [birth year]) Her menses are irregular. She has not undergone prior hysterosalpingogram. She has achieved 0 prior pregnancies.  She sees Dr. Love.    The couple has not undergone prior intrauterine insemination procedures.    The couple has not undergone prior in-vitro fertilization procedures.    Binh Owusu denies a history of exposure to harmful chemicals, toxins, and radiation.    No history of recent fevers greater than 101.5 degrees Farenheit.    No  "history of recent exposure to "wet heat."    No history of urological trauma or testicular torsion.    No history of prostatitis, epididymitis, and orchitis.    No history of post-pubertal mumps.    There is no known family history of fertility problems.    REVIEW OF SYSTEMS:     He denies headache, blurred vision, fever, nausea, vomiting, chills, abdominal pain, chest pain, sore throat, bleeding per rectum, cough, SOB, recent loss of consciousness, recent mental status changes, seizures, dizziness, or upper or lower extremity weakness.    PHYSICAL EXAM:     The patient generally appears in good health, is appropriately interactive, and is in no apparent distress.     Eyes: anicteric sclerae, moist conjunctivae; no lid-lag; PERRLA     HENT: Atraumatic; oropharynx clear with moist mucous membranes and no mucosal ulcerations;normal hard and soft palate.  No evidence of lymphadenopathy.    Neck: Trachea midline.  No thyromegaly.    Skin: No lesions.    Mental: Cooperative with normal affect.  Is oriented to time, place, and person.    Neuro: Grossly intact.    Chest: Normal inspiratory effort.   No accessory muscles.  No audible wheezes.  Respirations symmetric on inspiration and expiration.    Heart: Regular rhythm.      Abdomen:  Soft, non-tender. No masses or organomegaly. Bladder is not palpable. No evidence of flank discomfort. No evidence of inguinal hernia.    Genitourinary: Penis is normal with no evidence of plaques or induration. Urethral meatus is normal. Scrotum is normal. Testes are descended bilaterally with no evidence of abnormal masses or tenderness. Epididymis, vas deferens, and cord structures are normal bilaterally.  Testicular volume is approximately 18 cc bilaterally.      Extremities: No cyanosis, clubbing, or edema.    IMPRESSION & PLAN:    Mr. Owusu is a 33 y.o.  male who has been  to his wife for the past 1 year. They have been trying to achieve a pregnancy for the past 1 years but " "without success. 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 He denies a history of erectile dysfunction and ejaculatory problems.    He was started on clomid for a low T.    Is s/p L varicocelectomy on 9/5/23.    Lab Results   Component Value Date    TOTALTESTOST 538 08/30/2023    ESTRADIOL 52 (H) 08/30/2023    HORMONE PANEL 5/24/23  T--224  LH--5.5  FSH--11.7  Prolactin--13.0    SA 7/10/23 ([facility])--5.9 cc/cryptospermia (0 motile)  SA 7/20/23 ([facility])--6.3 cc/0.001 million per cc/43.5%/0%    1.  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  2.  Continue clomid.  Refilled.  3. RTC 3 months with a SA.  4.  Recommend avoiding "wet heat."  5.  Recommend taking a multivitamin and 500 mg of vitamin c daily in addition to the multivitamin.  6.  Please send a copy of the note to Dr. Love.       CC: Ivan        "

## 2023-10-19 LAB
CHROM BANDING METHOD: NORMAL
CHROMOSOME ANALYSIS CONGENITAL ADDITIONAL INFORMATION: NORMAL
CHROMOSOME ANALYSIS CONGENITAL RELEASED BY: NORMAL
CHROMOSOME ANALYSIS CONGENITAL RESULT SUMMARY: NORMAL
CLINICAL CYTOGENETICIST REVIEW: NORMAL
GENETICIST REVIEW: NORMAL
KARYOTYP SPEC: NORMAL
REASON FOR REFERRAL, CHROMOSOME ANALYSIS CONGENITAL: NORMAL
REF LAB TEST METHOD: NORMAL
SPECIMEN SOURCE: NORMAL
SPECIMEN SOURCE: NORMAL
SPECIMEN, CHROMOSOME ANALYSIS CONGENITAL: NORMAL
Y CHROM DEL BLD/T: NORMAL
Y MICRODELETION RELEASED BY: NORMAL
Y MICRODELETION RESULT SUMMARY: NEGATIVE
Y MICRODELETION SPECIMEN: NORMAL

## 2025-01-03 ENCOUNTER — PATIENT MESSAGE (OUTPATIENT)
Dept: UROLOGY | Facility: CLINIC | Age: 35
End: 2025-01-03
Payer: COMMERCIAL

## (undated) DEVICE — CORD BIPOLAR 12 FOOT

## (undated) DEVICE — CATH IV INTROCAN 24G X 3/4

## (undated) DEVICE — SUT VICRYL 3-0 27 SH

## (undated) DEVICE — TOWEL OR DISP STRL BLUE 4/PK

## (undated) DEVICE — DRAIN PENROSE XRAY 12 X 1/4 ST

## (undated) DEVICE — PAD CURAD NONADH 3X4IN

## (undated) DEVICE — ELECTRODE REM PLYHSV RETURN 9

## (undated) DEVICE — CLIPPER BLADE MOD 4406 (CAREF)

## (undated) DEVICE — DRESSING TRANS 2X2 TEGADERM

## (undated) DEVICE — GOWN SURGICAL X-LARGE

## (undated) DEVICE — DRAPE STERI INSTRUMENT 1018

## (undated) DEVICE — SUT MONOCRYL 4-0 PS-2

## (undated) DEVICE — FORCEP STRAIGHT DISP

## (undated) DEVICE — DRAPE STERI-DRAPE 1000 17X11IN

## (undated) DEVICE — SUT SILK 3-0 STRANDS 30IN

## (undated) DEVICE — DRAPE LAP T SHT W/ INSTR PAD

## (undated) DEVICE — BAG BANDED 10X10IN

## (undated) DEVICE — GLOVE SURG BIOGEL LATEX SZ 7.5

## (undated) DEVICE — DRAPE HALF SURGICAL 40X58IN

## (undated) DEVICE — LINER GLOVE POWDERFREE 8

## (undated) DEVICE — NDL HYPO REG 25G X 1 1/2

## (undated) DEVICE — TRAY MINOR GEN SURG OMC